# Patient Record
Sex: MALE | Race: WHITE | Employment: UNEMPLOYED | URBAN - METROPOLITAN AREA
[De-identification: names, ages, dates, MRNs, and addresses within clinical notes are randomized per-mention and may not be internally consistent; named-entity substitution may affect disease eponyms.]

---

## 2019-01-01 ENCOUNTER — ANESTHESIA EVENT (OUTPATIENT)
Dept: EMERGENCY DEPT | Age: 29
End: 2019-01-01

## 2019-01-01 ENCOUNTER — ANESTHESIA (OUTPATIENT)
Dept: EMERGENCY DEPT | Age: 29
End: 2019-01-01

## 2019-01-01 ENCOUNTER — HOSPITAL ENCOUNTER (EMERGENCY)
Age: 29
End: 2019-04-06
Attending: EMERGENCY MEDICINE

## 2019-01-01 DIAGNOSIS — I46.8 TRAUMATIC CARDIAC ARREST (HCC): Primary | ICD-10-CM

## 2019-01-01 PROCEDURE — 92950 HEART/LUNG RESUSCITATION CPR: CPT | Performed by: SURGERY

## 2019-01-01 PROCEDURE — 6360000002 HC RX W HCPCS: Performed by: EMERGENCY MEDICINE

## 2019-01-01 PROCEDURE — 99285 EMERGENCY DEPT VISIT HI MDM: CPT | Performed by: SURGERY

## 2019-01-01 PROCEDURE — 32551 INSERTION OF CHEST TUBE: CPT | Performed by: SURGERY

## 2019-01-01 PROCEDURE — 36415 COLL VENOUS BLD VENIPUNCTURE: CPT

## 2019-01-01 PROCEDURE — 99291 CRITICAL CARE FIRST HOUR: CPT

## 2019-01-01 PROCEDURE — 31500 INSERT EMERGENCY AIRWAY: CPT

## 2019-01-01 PROCEDURE — 6810039001 HC L1 TRAUMA PRIORITY

## 2019-01-01 PROCEDURE — 36556 INSERT NON-TUNNEL CV CATH: CPT | Performed by: SURGERY

## 2019-01-01 PROCEDURE — 31500 INSERT EMERGENCY AIRWAY: CPT | Performed by: ANESTHESIOLOGY

## 2019-01-01 RX ADMIN — EPINEPHRINE 1 MG: 0.1 INJECTION, SOLUTION ENDOTRACHEAL; INTRACARDIAC; INTRAVENOUS at 18:59

## 2019-01-01 RX ADMIN — EPINEPHRINE 1 MG: 0.1 INJECTION, SOLUTION ENDOTRACHEAL; INTRACARDIAC; INTRAVENOUS at 18:52

## 2019-01-01 RX ADMIN — EPINEPHRINE 1 MG: 0.1 INJECTION, SOLUTION ENDOTRACHEAL; INTRACARDIAC; INTRAVENOUS at 18:55

## 2019-01-01 RX ADMIN — EPINEPHRINE 1 MG: 0.1 INJECTION, SOLUTION ENDOTRACHEAL; INTRACARDIAC; INTRAVENOUS at 19:03

## 2019-04-05 NOTE — ANESTHESIA PROCEDURE NOTES
Airway  Date/Time: 4/5/2019 6:55 PM  Urgency: emergent    Difficult airway    General Information and Staff    Patient location during procedure: ED  Anesthesiologist: Mayank Pelayo MD  Resident/CRNA: NESTOR Thomson - CRNA  Other anesthesia staff: Tracey Lima RN  Performed: other anesthesia staff     Consent for Airway (if performed for an anesthetic, see related documentation for consents)  Patient identity confirmed: per hospital policy  Consent: The procedure was performed in an emergent situation. Verbal consent not obtained. Written consent not obtained.   Risks and benefits: risks, benefits and alternatives were not discussed      Indications and Patient Condition  Indications for airway management: cardio/pulmonary arrest  Spontaneous Ventilation: absent  Sedation level: no sedation  Preoxygenated: yes  Patient position: reverse Trendelenburg  MILS maintained throughout  Mask difficulty assessment: vent by bag mask + OA or adjuvant +/- NMBA    Final Airway Details  Final airway type: endotracheal airway      Successful airway: ETT  Cuffed: yes   Successful intubation technique: video laryngoscopy  Endotracheal tube insertion site: oral  Blade size: #4  ETT size (mm): 8.0  Cormack-Lehane Classification: grade IIa - partial view of glottis  Placement verified by: chest auscultation and capnometry   Measured from: lips  ETT to lips (cm): 24  Number of attempts at approach: 1  Ventilation between attempts: bag mask  Number of other approaches attempted: 0    no

## 2019-04-05 NOTE — ED NOTES
Compressions by Adams Loredo device on arrival     [facility]  04/05/19 8234
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
## 2019-04-05 NOTE — PROCEDURES
Ge Cam is a 30 y.o. male patient. No diagnosis found. No past medical history on file. There were no vitals taken for this visit.     Central Line  Date/Time: 4/5/2019 7:19 PM  Performed by: Sukhdev Barlow MD  Authorized by: Sukhdev Barlow MD     Consent:     Consent obtained:  Emergent situation  Pre-procedure details:     Skin preparation:  Betadine  Procedure details:     Location:  R femoral    Patient position:  Flat    Procedural supplies:  Single lumen    Catheter size:  9 Fr    Landmarks identified: yes    Post-procedure details:     Post-procedure:  Line sutured    Assessment:  Free fluid flow and blood return through all ports        Donna Callahan DO  4/5/2019
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
## 2019-04-06 PROCEDURE — 2500000003 HC RX 250 WO HCPCS

## 2019-04-06 NOTE — ED NOTES
[employer] [facility] has valuables, cell phone and drivers license. Valuables receipt placed in patients chart.       Roldan Farias RN  04/05/19 7196

## 2019-04-06 NOTE — ED NOTES
This RN witnessed blood drug kit provided to [employer] by Alyce Guthrie at bedside. This RN witnessed patient identified with two identifiers, skin cleansed with iodine, sight dried and specimen obtained without difficulty with butterfly needle from right antecubital. Specimen was immediately given to officer at bedside for proper legal labeling. Specimens sealed by officer and packed per legal draw protocol.       [address] MELIZA Johnston  04/05/19 8454

## 2019-04-06 NOTE — ED NOTES
Anahi Alexis, mother of patient, contacted and is on her way. It will takes approx 2 hours.       Eduardo Doe RN  04/05/19 2040

## 2019-04-06 NOTE — ED NOTES
contacted and states to send body to [facility] and will be picked up in the morning.       Xiomara Upton RN  04/05/19 2040

## 2019-04-06 NOTE — ED NOTES
Eyes prepped - saline administered in both eyes, eye lids closed and secured via ice in glove.        Lawyer Raúl RN  04/05/19 3638
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
## 2019-04-06 NOTE — ED NOTES
Blood drug kit provided to this nurse by Gilda Mitchell at bed side, pt identified with 2 identifiers, skin cleansed with betadine from kit provided, sight dried, specimen obtained without difficulty from right lateral AC, and given directly to officer at bed side for labeling. Specimens sealed by officer at bed side.         Patience Daugherty RN  04/05/19 2017